# Patient Record
Sex: FEMALE | Race: WHITE | Employment: FULL TIME | URBAN - METROPOLITAN AREA
[De-identification: names, ages, dates, MRNs, and addresses within clinical notes are randomized per-mention and may not be internally consistent; named-entity substitution may affect disease eponyms.]

---

## 2017-03-27 ENCOUNTER — OFFICE VISIT (OUTPATIENT)
Dept: OBGYN CLINIC | Age: 57
End: 2017-03-27
Payer: COMMERCIAL

## 2017-03-27 ENCOUNTER — HOSPITAL ENCOUNTER (OUTPATIENT)
Age: 57
Setting detail: SPECIMEN
Discharge: HOME | End: 2017-03-27
Payer: COMMERCIAL

## 2017-03-27 VITALS
WEIGHT: 170 LBS | DIASTOLIC BLOOD PRESSURE: 81 MMHG | SYSTOLIC BLOOD PRESSURE: 133 MMHG | HEIGHT: 63 IN | HEART RATE: 72 BPM | BODY MASS INDEX: 30.12 KG/M2

## 2017-03-27 DIAGNOSIS — Z01.419 ENCOUNTER FOR GYNECOLOGICAL EXAMINATION WITHOUT ABNORMAL FINDING: Primary | ICD-10-CM

## 2017-03-27 PROCEDURE — 99396 PREV VISIT EST AGE 40-64: CPT | Performed by: ADVANCED PRACTICE MIDWIFE

## 2017-03-30 LAB — CYTOLOGY REPORT: NORMAL

## 2017-04-04 ENCOUNTER — HOSPITAL ENCOUNTER (OUTPATIENT)
Dept: MAMMOGRAPHY | Age: 57
Discharge: HOME | End: 2017-04-04
Payer: COMMERCIAL

## 2017-04-04 DIAGNOSIS — Z01.419 ENCOUNTER FOR GYNECOLOGICAL EXAMINATION WITHOUT ABNORMAL FINDING: ICD-10-CM

## 2017-04-04 PROCEDURE — G0202 SCR MAMMO BI INCL CAD: HCPCS

## 2017-05-12 ENCOUNTER — HOSPITAL ENCOUNTER (OUTPATIENT)
Dept: GENERAL RADIOLOGY | Facility: CLINIC | Age: 57
Discharge: HOME | End: 2017-05-12
Payer: COMMERCIAL

## 2017-05-12 DIAGNOSIS — M17.11 PRIMARY OSTEOARTHRITIS OF RIGHT KNEE: ICD-10-CM

## 2017-05-12 PROCEDURE — 73562 X-RAY EXAM OF KNEE 3: CPT

## 2021-04-27 ENCOUNTER — HOSPITAL ENCOUNTER (OUTPATIENT)
Age: 61
Setting detail: SPECIMEN
Discharge: HOME | End: 2021-04-27
Payer: COMMERCIAL

## 2021-04-27 ENCOUNTER — OFFICE VISIT (OUTPATIENT)
Dept: OBGYN CLINIC | Age: 61
End: 2021-04-27
Payer: COMMERCIAL

## 2021-04-27 VITALS
HEART RATE: 97 BPM | SYSTOLIC BLOOD PRESSURE: 125 MMHG | BODY MASS INDEX: 32.71 KG/M2 | DIASTOLIC BLOOD PRESSURE: 78 MMHG | WEIGHT: 184.6 LBS | HEIGHT: 63 IN

## 2021-04-27 DIAGNOSIS — Z01.419 WOMEN'S ANNUAL ROUTINE GYNECOLOGICAL EXAMINATION: Primary | ICD-10-CM

## 2021-04-27 DIAGNOSIS — Z12.4 CERVICAL CANCER SCREENING: ICD-10-CM

## 2021-04-27 DIAGNOSIS — B37.2 YEAST INFECTION OF THE SKIN: ICD-10-CM

## 2021-04-27 DIAGNOSIS — N95.8 GENITOURINARY SYNDROME OF MENOPAUSE: ICD-10-CM

## 2021-04-27 PROCEDURE — 99396 PREV VISIT EST AGE 40-64: CPT | Performed by: ADVANCED PRACTICE MIDWIFE

## 2021-04-27 RX ORDER — NYSTATIN 100000 U/G
OINTMENT TOPICAL
Qty: 1 TUBE | Refills: 1 | Status: SHIPPED | OUTPATIENT
Start: 2021-04-27

## 2021-04-27 RX ORDER — NYSTATIN 100000 [USP'U]/G
POWDER TOPICAL
Qty: 1 BOTTLE | Refills: 1 | Status: SHIPPED | OUTPATIENT
Start: 2021-04-27

## 2021-04-27 SDOH — ECONOMIC STABILITY: TRANSPORTATION INSECURITY
IN THE PAST 12 MONTHS, HAS LACK OF TRANSPORTATION KEPT YOU FROM MEETINGS, WORK, OR FROM GETTING THINGS NEEDED FOR DAILY LIVING?: NO

## 2021-04-27 SDOH — ECONOMIC STABILITY: INCOME INSECURITY: HOW HARD IS IT FOR YOU TO PAY FOR THE VERY BASICS LIKE FOOD, HOUSING, MEDICAL CARE, AND HEATING?: NOT HARD AT ALL

## 2021-04-27 SDOH — SOCIAL STABILITY: SOCIAL NETWORK: IN A TYPICAL WEEK, HOW MANY TIMES DO YOU TALK ON THE PHONE WITH FAMILY, FRIENDS, OR NEIGHBORS?: NOT ASKED

## 2021-04-27 SDOH — SOCIAL STABILITY: SOCIAL INSECURITY
WITHIN THE LAST YEAR, HAVE YOU BEEN KICKED, HIT, SLAPPED, OR OTHERWISE PHYSICALLY HURT BY YOUR PARTNER OR EX-PARTNER?: NO

## 2021-04-27 SDOH — SOCIAL STABILITY: SOCIAL NETWORK: ARE YOU MARRIED, WIDOWED, DIVORCED, SEPARATED, NEVER MARRIED, OR LIVING WITH A PARTNER?: NOT ASKED

## 2021-04-27 SDOH — ECONOMIC STABILITY: FOOD INSECURITY: WITHIN THE PAST 12 MONTHS, YOU WORRIED THAT YOUR FOOD WOULD RUN OUT BEFORE YOU GOT MONEY TO BUY MORE.: NEVER TRUE

## 2021-04-27 SDOH — SOCIAL STABILITY: SOCIAL INSECURITY
WITHIN THE LAST YEAR, HAVE TO BEEN RAPED OR FORCED TO HAVE ANY KIND OF SEXUAL ACTIVITY BY YOUR PARTNER OR EX-PARTNER?: NO

## 2021-04-27 ASSESSMENT — ANXIETY QUESTIONNAIRES
4. TROUBLE RELAXING: 0-NOT AT ALL
GAD7 TOTAL SCORE: 0
3. WORRYING TOO MUCH ABOUT DIFFERENT THINGS: 0-NOT AT ALL
7. FEELING AFRAID AS IF SOMETHING AWFUL MIGHT HAPPEN: 0-NOT AT ALL
2. NOT BEING ABLE TO STOP OR CONTROL WORRYING: 0-NOT AT ALL
5. BEING SO RESTLESS THAT IT IS HARD TO SIT STILL: 0-NOT AT ALL
6. BECOMING EASILY ANNOYED OR IRRITABLE: 0-NOT AT ALL

## 2021-04-27 ASSESSMENT — PATIENT HEALTH QUESTIONNAIRE - PHQ9
2. FEELING DOWN, DEPRESSED OR HOPELESS: 0
SUM OF ALL RESPONSES TO PHQ9 QUESTIONS 1 & 2: 0
SUM OF ALL RESPONSES TO PHQ QUESTIONS 1-9: 0

## 2021-04-27 ASSESSMENT — ENCOUNTER SYMPTOMS
RESPIRATORY NEGATIVE: 1
GASTROINTESTINAL NEGATIVE: 1

## 2021-04-27 NOTE — PROGRESS NOTES
Date of Visit: 4/27/2021    SUBJECTIVE:  Chief Complaint   Patient presents with    Gynecologic Exam     last pap 3/27/17 wnl, [facility] 4/4/17 normal       HPI  Mark Rawls arrives for annual Well Woman exam.  Patient denies concerns today. Enjoying grandbabies! Getting back to work after [employer] she develops skin yeast infection between breasts, itchy at times    Her No LMP recorded. (Menstrual status: Other - See Notes). Rosa Isela Miranda Her bowel habits are regular. She denies any bloating. She denies dysuria. She denies urinary leaking. She denies vaginal discharge. She is not sexually active     Depression/Anxiety screen:  PHQ Scores 4/27/2021   PHQ2 Score 0   PHQ9 Score 0     Interpretation of Total Score Depression Severity: 1-4 = Minimal depression, 5-9 = Mild depression, 10-14 = Moderate depression, 15-19 = Moderately severe depression, 20-27 = Severe depression  CHRISTI 7 SCORE 4/27/2021   CHRISTI-7 Total Score 0     Interpretation of CHRISTI-7 score: 5-9 = mild anxiety, 10-14 = moderate anxiety, 15+ = severe anxiety. Recommend referral to behavioral health for scores 10 or greater. Review of Systems   Constitutional: Negative. HENT: Negative. Respiratory: Negative. Cardiovascular: Negative. Gastrointestinal: Negative. Genitourinary: Negative. Musculoskeletal: Negative. Skin: Negative. Neurological: Negative. Psychiatric/Behavioral: Negative.           PREVENTIVE HEALTH SCREENING:   Date of last pap:  3/2017 normal              HPV typing/date:  2014 and negative      Date of last mammogram: approximate date 2017 and was normal  Date of last DEXA scan: NA  Date of last colonoscopy: 2011 and WNL    History of Gestational Diabetes: No     History of Pre-Eclampsia: No    Preventive screening through PCP: Yes    Family history of Breast, Ovarian, Colon or Uterine Cancer:  No     See updated review in Media     Genetic counseling:  Updated and declined      GYNECOLOGIC HISTORY:  Menarche: teens Menopause: Yes  HT use: No    STD history: No     Physical Exam:     Chaperone for Intimate Exam   Chaperone was offered as part of the rooming process. Patient declined and agrees to continue with exam without a chaperone.  Chaperone: NA    Constitutional:   Blood pressure 125/78, pulse 97, height 5' 3\" (1.6 m), weight 184 lb 9.6 oz (83.7 kg), not currently breastfeeding. Wt Readings from Last 3 Encounters:   04/27/21 184 lb 9.6 oz (83.7 kg)   03/27/17 170 lb (77.1 kg)   12/03/14 160 lb (72.6 kg)       General Appearance: This is a well-developed, well-nourished and well-groomed female. Alert and not in distress  Skin:  Inspection of the skin revealed no rashes or lesions  Neck and EENT:  No eye discharge and sclera non-icteric  Lips, teeth and gums without lesions and normal dentition  Nares are patent without discharge  Normal external ears are present with no hearing loss  The neck was supple with full range of motion and no masses. The thyroid was not enlarged and had no masses. No enlarged cervical lymph nodes  Lymphatic:   No lymph nodes were palpable in neck, axilla or groin   Neurologic:    Normal speech, no focal findings or movement disorder noted  Respiratory: The lungs were clear to auscultation bilaterally. There were no rales, rhonchi or wheezes. There was good respiratory effort. Cardiovascular: The heart was in a regular rhythm and rate was normal.  No murmur or extra sounds were noted. Breast:  The breasts are normal size and mildly asymmetrical.  There are no skin changes with position changes. The nipples are without deviations or discharge. No masses were palpated. No axillary or supraclavicular lymphadenopathy is present. Back:  Straight with no CVA tenderness present  Abdomen: The abdomen is soft and non-tender. There was no guarding, rebound or rigidity. The bladder was without fullness or tenderness. No hernias were appreciated. Pelvic:   The external genitalia has a 48)  [x] Discussed DEXA screening at age 71 )if no fracture history or Osteoporosis family history: Ordered per PCP  [x] Reinforced Calcium (split dose)/Vitamin D supplementation  [x] Hereditary Breast/Ovarian Cancer screening: Updated and declined  [] Hepatitis C screening  [] Immunizations:  [] Personal history of Pre-Eclampsia or GDM  [x] Tobacco & Secondary smoke risks: Not a smoker  [x] Health maintenance through PCP  Psychosocial Evaluation  [x] Intimate partner violence screening: NA  [x] Depression/Anxiety screening: Negative  [] Lifestyle/stress:  Cardiovascular Risk Factors  [] Family history  [] Hypertension  [] Dyslipidemia  [] Obesity  [] Diabetes Mellitus  [] Personal hx of PreE, GDM, or PIH  [] Lifestyle  - PAP SMEAR; Future    2. Genitourinary syndrome of menopause  Discussed difficulty with obtaining pap due to tissue atrophy, is asymptomatic otherwise. Continue expectant management  Discussed potential future pa at age 71 will be uncomfortable    3. Cervical cancer screening  - PAP SMEAR; Future      The patient, Tom Mary,  was seen for the visit on this date of service by the [facility]  The time component, involved both face-to-face (counseling and education)  and non face-to-face time (care coordination), spent in determining the total time component. She was also counseled on her preventative health maintenance recommendations and follow-up.     Electronically Signed by MEHUL Wilks CNM

## 2021-04-29 LAB
CYTOLOGY REPORT: NORMAL
HPV SAMPLE: NORMAL
HPV, GENOTYPE 16: NOT DETECTED
HPV, GENOTYPE 18: NOT DETECTED
HPV, HIGH RISK OTHER: NOT DETECTED
HPV, INTERPRETATION: NORMAL
SPECIMEN DESCRIPTION: NORMAL